# Patient Record
Sex: FEMALE | Race: BLACK OR AFRICAN AMERICAN | NOT HISPANIC OR LATINO | URBAN - METROPOLITAN AREA
[De-identification: names, ages, dates, MRNs, and addresses within clinical notes are randomized per-mention and may not be internally consistent; named-entity substitution may affect disease eponyms.]

---

## 2021-08-11 ENCOUNTER — INPATIENT (INPATIENT)
Facility: HOSPITAL | Age: 61
LOS: 2 days | Discharge: HOME | End: 2021-08-14
Attending: INTERNAL MEDICINE | Admitting: INTERNAL MEDICINE
Payer: MEDICAID

## 2021-08-11 VITALS — SYSTOLIC BLOOD PRESSURE: 144 MMHG | DIASTOLIC BLOOD PRESSURE: 72 MMHG | HEART RATE: 67 BPM | RESPIRATION RATE: 18 BRPM

## 2021-08-11 DIAGNOSIS — R07.9 CHEST PAIN, UNSPECIFIED: ICD-10-CM

## 2021-08-11 DIAGNOSIS — E78.5 HYPERLIPIDEMIA, UNSPECIFIED: ICD-10-CM

## 2021-08-11 DIAGNOSIS — Z29.9 ENCOUNTER FOR PROPHYLACTIC MEASURES, UNSPECIFIED: ICD-10-CM

## 2021-08-11 DIAGNOSIS — I25.10 ATHEROSCLEROTIC HEART DISEASE OF NATIVE CORONARY ARTERY WITHOUT ANGINA PECTORIS: ICD-10-CM

## 2021-08-11 DIAGNOSIS — R51.9 HEADACHE, UNSPECIFIED: ICD-10-CM

## 2021-08-11 DIAGNOSIS — I10 ESSENTIAL (PRIMARY) HYPERTENSION: ICD-10-CM

## 2021-08-11 LAB
ALBUMIN SERPL ELPH-MCNC: 4.4 G/DL — SIGNIFICANT CHANGE UP (ref 3.3–5)
ALP SERPL-CCNC: 99 U/L — SIGNIFICANT CHANGE UP (ref 40–120)
ALT FLD-CCNC: 27 U/L — SIGNIFICANT CHANGE UP (ref 4–33)
ANION GAP SERPL CALC-SCNC: 13 MMOL/L — SIGNIFICANT CHANGE UP (ref 7–14)
APTT BLD: 30.8 SEC — SIGNIFICANT CHANGE UP (ref 27–36.3)
AST SERPL-CCNC: 27 U/L — SIGNIFICANT CHANGE UP (ref 4–32)
BASOPHILS # BLD AUTO: 0.04 K/UL — SIGNIFICANT CHANGE UP (ref 0–0.2)
BASOPHILS NFR BLD AUTO: 0.8 % — SIGNIFICANT CHANGE UP (ref 0–2)
BILIRUB SERPL-MCNC: 1.2 MG/DL — SIGNIFICANT CHANGE UP (ref 0.2–1.2)
BUN SERPL-MCNC: 8 MG/DL — SIGNIFICANT CHANGE UP (ref 7–23)
CALCIUM SERPL-MCNC: 9.6 MG/DL — SIGNIFICANT CHANGE UP (ref 8.4–10.5)
CHLORIDE SERPL-SCNC: 102 MMOL/L — SIGNIFICANT CHANGE UP (ref 98–107)
CO2 SERPL-SCNC: 23 MMOL/L — SIGNIFICANT CHANGE UP (ref 22–31)
CREAT SERPL-MCNC: 0.67 MG/DL — SIGNIFICANT CHANGE UP (ref 0.5–1.3)
EOSINOPHIL # BLD AUTO: 0.16 K/UL — SIGNIFICANT CHANGE UP (ref 0–0.5)
EOSINOPHIL NFR BLD AUTO: 3.2 % — SIGNIFICANT CHANGE UP (ref 0–6)
GLUCOSE SERPL-MCNC: 95 MG/DL — SIGNIFICANT CHANGE UP (ref 70–99)
HCT VFR BLD CALC: 35.6 % — SIGNIFICANT CHANGE UP (ref 34.5–45)
HGB BLD-MCNC: 11.6 G/DL — SIGNIFICANT CHANGE UP (ref 11.5–15.5)
IANC: 2.74 K/UL — SIGNIFICANT CHANGE UP (ref 1.5–8.5)
IMM GRANULOCYTES NFR BLD AUTO: 0.4 % — SIGNIFICANT CHANGE UP (ref 0–1.5)
INR BLD: 1.14 RATIO — SIGNIFICANT CHANGE UP (ref 0.88–1.16)
LYMPHOCYTES # BLD AUTO: 1.76 K/UL — SIGNIFICANT CHANGE UP (ref 1–3.3)
LYMPHOCYTES # BLD AUTO: 34.7 % — SIGNIFICANT CHANGE UP (ref 13–44)
MCHC RBC-ENTMCNC: 30.3 PG — SIGNIFICANT CHANGE UP (ref 27–34)
MCHC RBC-ENTMCNC: 32.6 GM/DL — SIGNIFICANT CHANGE UP (ref 32–36)
MCV RBC AUTO: 93 FL — SIGNIFICANT CHANGE UP (ref 80–100)
MONOCYTES # BLD AUTO: 0.35 K/UL — SIGNIFICANT CHANGE UP (ref 0–0.9)
MONOCYTES NFR BLD AUTO: 6.9 % — SIGNIFICANT CHANGE UP (ref 2–14)
NEUTROPHILS # BLD AUTO: 2.74 K/UL — SIGNIFICANT CHANGE UP (ref 1.8–7.4)
NEUTROPHILS NFR BLD AUTO: 54 % — SIGNIFICANT CHANGE UP (ref 43–77)
NRBC # BLD: 0 /100 WBCS — SIGNIFICANT CHANGE UP
NRBC # FLD: 0 K/UL — SIGNIFICANT CHANGE UP
PLATELET # BLD AUTO: 267 K/UL — SIGNIFICANT CHANGE UP (ref 150–400)
POTASSIUM SERPL-MCNC: 4.2 MMOL/L — SIGNIFICANT CHANGE UP (ref 3.5–5.3)
POTASSIUM SERPL-SCNC: 4.2 MMOL/L — SIGNIFICANT CHANGE UP (ref 3.5–5.3)
PROT SERPL-MCNC: 7.7 G/DL — SIGNIFICANT CHANGE UP (ref 6–8.3)
PROTHROM AB SERPL-ACNC: 12.9 SEC — SIGNIFICANT CHANGE UP (ref 10.6–13.6)
RBC # BLD: 3.83 M/UL — SIGNIFICANT CHANGE UP (ref 3.8–5.2)
RBC # FLD: 13.3 % — SIGNIFICANT CHANGE UP (ref 10.3–14.5)
SARS-COV-2 RNA SPEC QL NAA+PROBE: SIGNIFICANT CHANGE UP
SODIUM SERPL-SCNC: 138 MMOL/L — SIGNIFICANT CHANGE UP (ref 135–145)
TROPONIN T, HIGH SENSITIVITY RESULT: <6 NG/L — SIGNIFICANT CHANGE UP
TROPONIN T, HIGH SENSITIVITY RESULT: <6 NG/L — SIGNIFICANT CHANGE UP
WBC # BLD: 5.07 K/UL — SIGNIFICANT CHANGE UP (ref 3.8–10.5)
WBC # FLD AUTO: 5.07 K/UL — SIGNIFICANT CHANGE UP (ref 3.8–10.5)

## 2021-08-11 PROCEDURE — 99223 1ST HOSP IP/OBS HIGH 75: CPT

## 2021-08-11 PROCEDURE — 99285 EMERGENCY DEPT VISIT HI MDM: CPT

## 2021-08-11 PROCEDURE — 71045 X-RAY EXAM CHEST 1 VIEW: CPT | Mod: 26

## 2021-08-11 RX ORDER — CLOPIDOGREL BISULFATE 75 MG/1
75 TABLET, FILM COATED ORAL DAILY
Refills: 0 | Status: DISCONTINUED | OUTPATIENT
Start: 2021-08-11 | End: 2021-08-14

## 2021-08-11 RX ORDER — ATORVASTATIN CALCIUM 80 MG/1
20 TABLET, FILM COATED ORAL AT BEDTIME
Refills: 0 | Status: DISCONTINUED | OUTPATIENT
Start: 2021-08-11 | End: 2021-08-14

## 2021-08-11 RX ORDER — ATORVASTATIN CALCIUM 80 MG/1
1 TABLET, FILM COATED ORAL
Qty: 0 | Refills: 0 | DISCHARGE

## 2021-08-11 RX ORDER — ISOSORBIDE DINITRATE 5 MG/1
5 TABLET ORAL THREE TIMES A DAY
Refills: 0 | Status: DISCONTINUED | OUTPATIENT
Start: 2021-08-11 | End: 2021-08-14

## 2021-08-11 RX ORDER — METOPROLOL TARTRATE 50 MG
50 TABLET ORAL
Refills: 0 | Status: DISCONTINUED | OUTPATIENT
Start: 2021-08-11 | End: 2021-08-14

## 2021-08-11 RX ORDER — ASPIRIN/CALCIUM CARB/MAGNESIUM 324 MG
81 TABLET ORAL DAILY
Refills: 0 | Status: DISCONTINUED | OUTPATIENT
Start: 2021-08-11 | End: 2021-08-14

## 2021-08-11 RX ORDER — ASPIRIN/CALCIUM CARB/MAGNESIUM 324 MG
1 TABLET ORAL
Qty: 0 | Refills: 0 | DISCHARGE

## 2021-08-11 RX ORDER — ACETAMINOPHEN 500 MG
650 TABLET ORAL EVERY 6 HOURS
Refills: 0 | Status: DISCONTINUED | OUTPATIENT
Start: 2021-08-11 | End: 2021-08-14

## 2021-08-11 RX ORDER — ISOSORBIDE DINITRATE 5 MG/1
0.5 TABLET ORAL
Qty: 0 | Refills: 0 | DISCHARGE

## 2021-08-11 RX ORDER — METOPROLOL TARTRATE 50 MG
1 TABLET ORAL
Qty: 0 | Refills: 0 | DISCHARGE

## 2021-08-11 RX ORDER — RAMIPRIL 5 MG
0.5 CAPSULE ORAL
Qty: 0 | Refills: 0 | DISCHARGE

## 2021-08-11 RX ORDER — LISINOPRIL 2.5 MG/1
20 TABLET ORAL
Refills: 0 | Status: DISCONTINUED | OUTPATIENT
Start: 2021-08-11 | End: 2021-08-14

## 2021-08-11 NOTE — ED ADULT NURSE NOTE - OBJECTIVE STATEMENT
Break coverage RN- Received Pt in room 15. pt A&OX3. pt with hx of HTN. Pt brought in by EMS from home. Pt c/o squeezing left sided chest pain radiating to the middle of the chest with sob beginning this morning. Pt came from [facility] July 12th and states she had elevated troponin when she was there that was treated with medication. Pt in no distress at this time. vitals stable as noted. respirations are equal and nonlabored, no respiratory distress noted, sating 100% room air. NSR on monitor. pt denies any sob, cough, fever/chills, headache, dizziness, n/v at this time. Pt arrives with 18 gauge iv to the left hand flushing well, labs sent. pt received 4 baby aspirin and 1 nitro sublingual by EMS. pt stable at this time, awaiting further plan. will reassess and monitor.

## 2021-08-11 NOTE — ED PROVIDER NOTE - CLINICAL SUMMARY MEDICAL DECISION MAKING FREE TEXT BOX
61F PMH HTN, HLD, on plavix for elevated troponin and chest pain in [facility] (May 2021) no stent placed, BIBEMS for 5.5 hours of intermittent squeezing left sided chest pain radiating to the sternum. Pt traveled from [facility] 1 month ago, denies SOB, cough, leg swelling.   No tachycardia, normal O2 sat, no calf tenderness, leg edema, distal pulses equal in all extremities.  ACS vs CHF vs angina vs myocarditis  ecg, labs, cxr  reassess after labs  Likely admit to medicine for observation and cardiac workup

## 2021-08-11 NOTE — ED ADULT NURSE REASSESSMENT NOTE - NS ED NURSE REASSESS COMMENT FT1
Patient A&Ox4, respirations even and unlabored. No complaints at this time. Report given to ESSU 2 RN.

## 2021-08-11 NOTE — ED ADULT TRIAGE NOTE - CHIEF COMPLAINT QUOTE
Patient bought to ER by EMS from home for c/o squeezing left sided chest pain radiating to middle. Pt came from [facility] July 12th and states she had elevated troponin when she was there that was treated with medication.  Left hand 18 gauge, 4 baby aspirin and 1 Nitro sublingual  Key Lopez [phone] Sister  [phone] Cousin

## 2021-08-11 NOTE — H&P ADULT - NSICDXFAMILYHX_GEN_ALL_CORE_FT
FAMILY HISTORY:  Family history of cerebrovascular accident (CVA) in father    Mother  Still living? Unknown  Family history of heart disease, Age at diagnosis: Age Unknown

## 2021-08-11 NOTE — H&P ADULT - NSHPSOCIALHISTORY_GEN_ALL_CORE
Lives in [facility], visiting family on vacation  Retired - worked in textile industry  Never smoker  Denies alcohol or illicit drug use

## 2021-08-11 NOTE — ED ADULT NURSE NOTE - CHIEF COMPLAINT QUOTE
Patient bought to ER by EMS from home for c/o squeezing left sided chest pain radiating to middle. Pt came from [facility] July 12th and states she had elevated troponin when she was there that was treated with medication.  Left hand 18 gauge, 4 baby aspirin and 1 Nitro sublingual  Key Lopez [phone] Sister  [phone] Cousin

## 2021-08-11 NOTE — ED PROVIDER NOTE - NSICDXFAMILYHX_GEN_ALL_CORE_FT
detailed exam FAMILY HISTORY:  Family history of cerebrovascular accident (CVA) in father    Mother  Still living? Unknown  Family history of heart disease, Age at diagnosis: Age Unknown

## 2021-08-11 NOTE — H&P ADULT - NSHPLABSRESULTS_GEN_ALL_CORE
11.6   5.07  )-----------( 267      ( 11 Aug 2021 16:57 )             35.6     08-11    138  |  102  |  8   ----------------------------<  95  4.2   |  23  |  0.67    Ca    9.6      11 Aug 2021 16:57    TPro  7.7  /  Alb  4.4  /  TBili  1.2  /  DBili  x   /  AST  27  /  ALT  27  /  AlkPhos  99  08-11    PT/INR - ( 11 Aug 2021 16:57 )   PT: 12.9 sec;   INR: 1.14 ratio    PTT - ( 11 Aug 2021 16:57 )  PTT:30.8 sec    Troponin T, High Sensitivity Result: <6 ng/L (08.11.21 @ 18:52)  Troponin T, High Sensitivity Result: <6 ng/L (08.11.21 @ 16:57)    < from: Xray Chest 1 View AP/PA (08.11.21 @ 17:11) >  LINES/TUBES: None  CARDIAC/MEDIASTINUM/HILUM: Unremarkable  LUNG PARENCHYMA/PLEURA: No focal parenchymal opacities, pleural effusion, or pneumothorax.  SKELETON/SOFT TISSUES: No osseous abnormality.  VASCULAR: Unremarkable.  IMPRESSION: Clear lungs.  < end of copied text > 11.6   5.07  )-----------( 267      ( 11 Aug 2021 16:57 )             35.6     08-11    138  |  102  |  8   ----------------------------<  95  4.2   |  23  |  0.67    Ca    9.6      11 Aug 2021 16:57    TPro  7.7  /  Alb  4.4  /  TBili  1.2  /  DBili  x   /  AST  27  /  ALT  27  /  AlkPhos  99  08-11    PT/INR - ( 11 Aug 2021 16:57 )   PT: 12.9 sec;   INR: 1.14 ratio    PTT - ( 11 Aug 2021 16:57 )  PTT:30.8 sec    Troponin T, High Sensitivity Result: <6 ng/L (08.11.21 @ 18:52)  Troponin T, High Sensitivity Result: <6 ng/L (08.11.21 @ 16:57)    < from: Xray Chest 1 View AP/PA (08.11.21 @ 17:11) >  LINES/TUBES: None  CARDIAC/MEDIASTINUM/HILUM: Unremarkable  LUNG PARENCHYMA/PLEURA: No focal parenchymal opacities, pleural effusion, or pneumothorax.  SKELETON/SOFT TISSUES: No osseous abnormality.  VASCULAR: Unremarkable.  IMPRESSION: Clear lungs.  < end of copied text >    EKG personally reviewed - 60bpm NSR, TWI V1-V2, flat T in III, QTc 426ms

## 2021-08-11 NOTE — H&P ADULT - PROBLEM SELECTOR PLAN 1
negative trops x2  monitor on tele  low suspicion for PE however given recent travel will check d-dimer with AM labs  check TSH, FLP, A1c with AM labs  check TTE  cardiology to assume care in AM

## 2021-08-11 NOTE — H&P ADULT - HISTORY OF PRESENT ILLNESS
61-yaer-old female with HTN, HLD, CAD on clopidogrel (for elevated troponin and chest pain in [facility] 5/2021, no stents), presenting with 5.5 hours of intermittent squeezing left sided chest pain radiating to the sternum. Patient received 4 baby aspirin and 1 nitroglycerin from EMS. Pt also reports pins and needles in the left hand, and lightheadedness, denies n/v/palpitations/diaphoresis. Pt traveled from [facility] 1 month ago, denies SOB, cough, leg swelling.    In the ED VS:  97.9  64-68  143-151/68-78  15-18  100%RA 61-yearr-old female with HTN, HLD, ?CAD on DAPT for ?elevated troponin and chest pain in [facility] (no history of echo/stress/stents), presenting with intermittent left sided chest pain radiating to the sternum since 10AM. Pain started while patient was getting ready this morning. Pain improved slightly with nitroglycerin at home. She reports pain was 9/10, lasting a few minutes at a time, at one point radiates to left neck, and at times radiates to left upper back. She noted associated diaphoresis and had numbness in her fingers of her left hand transiently that self resolved. She had an episode of vertigo at home that also self resolved. She notes a frontal headache, no vision changes, no weakness. Patient received 4 baby aspirin and 1 nitroglycerin from EMS. At present pain is 6/10, radiating only to sternum. Pt traveled from [facility] 1 month ago, is here on vacation. She denies shortness of breath, cough, LE edema.    In the ED VS:  97.9  64-68  143-151/68-78  15-18  100%RA

## 2021-08-11 NOTE — ED PROVIDER NOTE - OBJECTIVE STATEMENT
61F PMH HTN, HLD, on plavix for elevated troponin and chest pain in [facility] (May 2021) no stent placed, complaining of 5.5 hours of intermittent squeezing left sided and sternal chest pain 61F PMH HTN, HLD, on plavix for elevated troponin and chest pain in [facility] (May 2021) no stent placed, BIBEMS for 5.5 hours of intermittent squeezing left sided chest pain radiating to the sternum. 4 baby aspirin and 1 nitroglycerin given by EMS. Pt also reports pins and needles in the left hand, and lightheadedness, denies n/v/palpitations/diaphoresis. Pt traveled from [facility] 1 month ago, denies SOB, cough, leg swelling.

## 2021-08-11 NOTE — H&P ADULT - ASSESSMENT
61-yearr-old female with HTN, HLD, ?CAD on DAPT for ?elevated troponin and chest pain in [facility] (no history of echo/stress/stents), presenting with intermittent left sided chest pain

## 2021-08-11 NOTE — ED PROVIDER NOTE - ATTENDING CONTRIBUTION TO CARE
61F p/w squeezing L sided CP rad to middle.  Recent travel from [facility] Jul 12.  EKG SR at 61 no tashia no std no twi.  pmhx HTN HLD recent CP elevated trop, on plavix starting in May.  Pt unsure if stent.  Denies surgeries.  5.5 hrs of L side squeezing CP rad to midsternal area.  no N/V or diaphoresis.  Some lightheadness.  EKG SR at 61 no tashia no std.  twi v2.  incomplete RBBB.  Given baby ASA and SL NTG.  Pain is intermittent.  No SOB.  No cough.  Pt came from [facility] 1 month ago, no LE swelling.  Plan admit concern for ACS 61F p/w squeezing L sided CP rad to middle.  Recent travel from [facility] Jul 12. Pmhx HTN HLD recent CP elevated trop, on plavix starting in May.  Pt unsure if stent.  Denies surgeries.  5.5 hrs of L side squeezing CP rad to midsternal area.  no N/V or diaphoresis.  Some lightheadness.  Given baby ASA and SL NTG.  Pain is intermittent.  No SOB.  No cough.  Pt came from [facility] 1 month ago, no LE swelling.  Plan admit concern for ACS.  VS:  unremarkable    GEN - NAD;   well appearing;   A+O x3   HEAD - NC/AT     ENT - PEERL, EOMI, mucous membranes    moist , no discharge      NECK: Neck supple, non-tender without lymphadenopathy, no masses, no JVD  PULM - CTA b/l,  symmetric breath sounds  COR -  normal heart sounds    ABD - , ND, NT, soft,  BACK - no CVA tenderness, nontender spine     EXTREMS - no edema, no deformity, warm and well perfused    SKIN - no rash    or bruising      NEUROLOGIC - alert, face symmetric, speech fluent, sensation nl, motor no focal deficit.

## 2021-08-11 NOTE — H&P ADULT - NSHPPHYSICALEXAM_GEN_ALL_CORE
PHYSICAL EXAM:  GENERAL: NAD, well-developed, well-nourished  HEAD:  Atraumatic, Normocephalic  EYES: PERRL, conjunctiva and sclera clear  NECK: Supple, No JVD, no LAD  CHEST/LUNG: Clear to auscultation bilaterally; No wheezes, rales or rhonchi; good effort; good air movement  HEART: Regular rate and rhythm; No murmurs, rubs, or gallops, (+)S1, S2  ABDOMEN: Soft, Nontender, Nondistended; Normal Bowel sounds   EXTREMITIES:  2+ Peripheral Pulses, No clubbing, cyanosis, or edema  PSYCH: normal mood and affect, A&Ox3  NEUROLOGY: non-focal, sensation grossly intact; 5/5 strength b/l UE  SKIN: No rashes or lesions

## 2021-08-11 NOTE — H&P ADULT - NSHPREVIEWOFSYSTEMS_GEN_ALL_CORE
REVIEW OF SYSTEMS:    CONSTITUTIONAL: No weakness, fevers or chills  EYES/ENT: No visual changes;  No dysphagia; No sore throat; No rhinorrhea; No sinus pain/pressure  NECK: No pain or stiffness  RESPIRATORY: No cough, wheezing, hemoptysis; No shortness of breath  CARDIOVASCULAR: (+) chest pain; No palpitations; No lower extremity edema  GASTROINTESTINAL: No abdominal or epigastric pain. No nausea, vomiting, or hematemesis; No diarrhea or constipation. No melena or hematochezia.  GENITOURINARY: No dysuria, frequency or hematuria  NEUROLOGICAL: No numbness or weakness; transient paresthesias L hand  MSK: ambulates without aid; no falls/trauma  SKIN: No itching, burning, rashes, or lesions   All other review of systems is negative unless indicated above.

## 2021-08-12 LAB
ANION GAP SERPL CALC-SCNC: 14 MMOL/L — SIGNIFICANT CHANGE UP (ref 7–14)
BUN SERPL-MCNC: 8 MG/DL — SIGNIFICANT CHANGE UP (ref 7–23)
CALCIUM SERPL-MCNC: 9.9 MG/DL — SIGNIFICANT CHANGE UP (ref 8.4–10.5)
CHLORIDE SERPL-SCNC: 104 MMOL/L — SIGNIFICANT CHANGE UP (ref 98–107)
CHOLEST SERPL-MCNC: 199 MG/DL — SIGNIFICANT CHANGE UP
CK MB CFR SERPL CALC: 1.3 NG/ML — SIGNIFICANT CHANGE UP
CO2 SERPL-SCNC: 23 MMOL/L — SIGNIFICANT CHANGE UP (ref 22–31)
COVID-19 SPIKE DOMAIN AB INTERP: POSITIVE
COVID-19 SPIKE DOMAIN ANTIBODY RESULT: >250 U/ML — HIGH
CREAT SERPL-MCNC: 0.69 MG/DL — SIGNIFICANT CHANGE UP (ref 0.5–1.3)
D DIMER BLD IA.RAPID-MCNC: <150 NG/ML DDU — SIGNIFICANT CHANGE UP
GLUCOSE SERPL-MCNC: 106 MG/DL — HIGH (ref 70–99)
HCT VFR BLD CALC: 38.1 % — SIGNIFICANT CHANGE UP (ref 34.5–45)
HCV AB S/CO SERPL IA: 0.15 S/CO — SIGNIFICANT CHANGE UP (ref 0–0.99)
HCV AB SERPL-IMP: SIGNIFICANT CHANGE UP
HDLC SERPL-MCNC: 60 MG/DL — SIGNIFICANT CHANGE UP
HGB BLD-MCNC: 12.4 G/DL — SIGNIFICANT CHANGE UP (ref 11.5–15.5)
LIPID PNL WITH DIRECT LDL SERPL: 129 MG/DL — HIGH
MAGNESIUM SERPL-MCNC: 2.1 MG/DL — SIGNIFICANT CHANGE UP (ref 1.6–2.6)
MCHC RBC-ENTMCNC: 30.4 PG — SIGNIFICANT CHANGE UP (ref 27–34)
MCHC RBC-ENTMCNC: 32.5 GM/DL — SIGNIFICANT CHANGE UP (ref 32–36)
MCV RBC AUTO: 93.4 FL — SIGNIFICANT CHANGE UP (ref 80–100)
NON HDL CHOLESTEROL: 139 MG/DL — HIGH
NRBC # BLD: 0 /100 WBCS — SIGNIFICANT CHANGE UP
NRBC # FLD: 0 K/UL — SIGNIFICANT CHANGE UP
NT-PROBNP SERPL-SCNC: 115 PG/ML — SIGNIFICANT CHANGE UP
PHOSPHATE SERPL-MCNC: 3.6 MG/DL — SIGNIFICANT CHANGE UP (ref 2.5–4.5)
PLATELET # BLD AUTO: 266 K/UL — SIGNIFICANT CHANGE UP (ref 150–400)
POTASSIUM SERPL-MCNC: 4.2 MMOL/L — SIGNIFICANT CHANGE UP (ref 3.5–5.3)
POTASSIUM SERPL-SCNC: 4.2 MMOL/L — SIGNIFICANT CHANGE UP (ref 3.5–5.3)
RBC # BLD: 4.08 M/UL — SIGNIFICANT CHANGE UP (ref 3.8–5.2)
RBC # FLD: 13.2 % — SIGNIFICANT CHANGE UP (ref 10.3–14.5)
SARS-COV-2 IGG+IGM SERPL QL IA: >250 U/ML — HIGH
SARS-COV-2 IGG+IGM SERPL QL IA: POSITIVE
SODIUM SERPL-SCNC: 141 MMOL/L — SIGNIFICANT CHANGE UP (ref 135–145)
TRIGL SERPL-MCNC: 52 MG/DL — SIGNIFICANT CHANGE UP
TROPONIN T, HIGH SENSITIVITY RESULT: <6 NG/L — SIGNIFICANT CHANGE UP
TROPONIN T, HIGH SENSITIVITY RESULT: <6 NG/L — SIGNIFICANT CHANGE UP
TSH SERPL-MCNC: 1.42 UIU/ML — SIGNIFICANT CHANGE UP (ref 0.27–4.2)
WBC # BLD: 3.8 K/UL — SIGNIFICANT CHANGE UP (ref 3.8–10.5)
WBC # FLD AUTO: 3.8 K/UL — SIGNIFICANT CHANGE UP (ref 3.8–10.5)

## 2021-08-12 RX ADMIN — LISINOPRIL 20 MILLIGRAM(S): 2.5 TABLET ORAL at 18:04

## 2021-08-12 RX ADMIN — LISINOPRIL 20 MILLIGRAM(S): 2.5 TABLET ORAL at 09:04

## 2021-08-12 RX ADMIN — Medication 650 MILLIGRAM(S): at 11:48

## 2021-08-12 RX ADMIN — ISOSORBIDE DINITRATE 5 MILLIGRAM(S): 5 TABLET ORAL at 18:05

## 2021-08-12 RX ADMIN — Medication 650 MILLIGRAM(S): at 19:10

## 2021-08-12 RX ADMIN — ISOSORBIDE DINITRATE 5 MILLIGRAM(S): 5 TABLET ORAL at 05:18

## 2021-08-12 RX ADMIN — ISOSORBIDE DINITRATE 5 MILLIGRAM(S): 5 TABLET ORAL at 09:05

## 2021-08-12 RX ADMIN — Medication 50 MILLIGRAM(S): at 18:04

## 2021-08-12 RX ADMIN — Medication 650 MILLIGRAM(S): at 11:36

## 2021-08-12 RX ADMIN — Medication 650 MILLIGRAM(S): at 18:19

## 2021-08-12 RX ADMIN — CLOPIDOGREL BISULFATE 75 MILLIGRAM(S): 75 TABLET, FILM COATED ORAL at 09:04

## 2021-08-12 RX ADMIN — Medication 81 MILLIGRAM(S): at 11:36

## 2021-08-12 NOTE — CHART NOTE - NSCHARTNOTEFT_GEN_A_CORE
Notified by RN, patient was complaining of chest pain. Patient seen and examined at bedside. States the chest pain is 6/10, starts from midsternal area, radiating to the back. States she feels more of the pain in the back compared to the front. States chest pain comes and goes. Denies any dizziness, shortness of breath, palpitations. EKG unchanged. Cardiac enzymes ordered. Tylenol given. Discussed with cardiologist, recommends CT to rule out dissection.

## 2021-08-12 NOTE — PATIENT PROFILE ADULT - NSPROGENPREVTRANSF_GEN_A_NUR
no
If you are a smoker, it is important for your health to stop smoking. Please be aware that second hand smoke is also harmful.

## 2021-08-13 LAB
ANION GAP SERPL CALC-SCNC: 13 MMOL/L — SIGNIFICANT CHANGE UP (ref 7–14)
BUN SERPL-MCNC: 18 MG/DL — SIGNIFICANT CHANGE UP (ref 7–23)
CALCIUM SERPL-MCNC: 10.1 MG/DL — SIGNIFICANT CHANGE UP (ref 8.4–10.5)
CHLORIDE SERPL-SCNC: 103 MMOL/L — SIGNIFICANT CHANGE UP (ref 98–107)
CO2 SERPL-SCNC: 24 MMOL/L — SIGNIFICANT CHANGE UP (ref 22–31)
CREAT SERPL-MCNC: 0.83 MG/DL — SIGNIFICANT CHANGE UP (ref 0.5–1.3)
GLUCOSE SERPL-MCNC: 94 MG/DL — SIGNIFICANT CHANGE UP (ref 70–99)
HCT VFR BLD CALC: 35.4 % — SIGNIFICANT CHANGE UP (ref 34.5–45)
HGB BLD-MCNC: 11.7 G/DL — SIGNIFICANT CHANGE UP (ref 11.5–15.5)
MAGNESIUM SERPL-MCNC: 2.1 MG/DL — SIGNIFICANT CHANGE UP (ref 1.6–2.6)
MCHC RBC-ENTMCNC: 30.7 PG — SIGNIFICANT CHANGE UP (ref 27–34)
MCHC RBC-ENTMCNC: 33.1 GM/DL — SIGNIFICANT CHANGE UP (ref 32–36)
MCV RBC AUTO: 92.9 FL — SIGNIFICANT CHANGE UP (ref 80–100)
NRBC # BLD: 0 /100 WBCS — SIGNIFICANT CHANGE UP
NRBC # FLD: 0 K/UL — SIGNIFICANT CHANGE UP
PHOSPHATE SERPL-MCNC: 4.2 MG/DL — SIGNIFICANT CHANGE UP (ref 2.5–4.5)
PLATELET # BLD AUTO: 272 K/UL — SIGNIFICANT CHANGE UP (ref 150–400)
POTASSIUM SERPL-MCNC: 4 MMOL/L — SIGNIFICANT CHANGE UP (ref 3.5–5.3)
POTASSIUM SERPL-SCNC: 4 MMOL/L — SIGNIFICANT CHANGE UP (ref 3.5–5.3)
RBC # BLD: 3.81 M/UL — SIGNIFICANT CHANGE UP (ref 3.8–5.2)
RBC # FLD: 13.5 % — SIGNIFICANT CHANGE UP (ref 10.3–14.5)
SODIUM SERPL-SCNC: 140 MMOL/L — SIGNIFICANT CHANGE UP (ref 135–145)
WBC # BLD: 4.78 K/UL — SIGNIFICANT CHANGE UP (ref 3.8–10.5)
WBC # FLD AUTO: 4.78 K/UL — SIGNIFICANT CHANGE UP (ref 3.8–10.5)

## 2021-08-13 PROCEDURE — 93016 CV STRESS TEST SUPVJ ONLY: CPT | Mod: GC

## 2021-08-13 PROCEDURE — 71275 CT ANGIOGRAPHY CHEST: CPT | Mod: 26

## 2021-08-13 PROCEDURE — 74174 CTA ABD&PLVS W/CONTRAST: CPT | Mod: 26

## 2021-08-13 PROCEDURE — 78452 HT MUSCLE IMAGE SPECT MULT: CPT | Mod: 26

## 2021-08-13 PROCEDURE — 76770 US EXAM ABDO BACK WALL COMP: CPT | Mod: 26

## 2021-08-13 PROCEDURE — 93018 CV STRESS TEST I&R ONLY: CPT | Mod: GC

## 2021-08-13 PROCEDURE — 93306 TTE W/DOPPLER COMPLETE: CPT | Mod: 26

## 2021-08-13 RX ADMIN — ATORVASTATIN CALCIUM 20 MILLIGRAM(S): 80 TABLET, FILM COATED ORAL at 21:13

## 2021-08-13 RX ADMIN — Medication 650 MILLIGRAM(S): at 13:46

## 2021-08-13 RX ADMIN — LISINOPRIL 20 MILLIGRAM(S): 2.5 TABLET ORAL at 17:16

## 2021-08-13 RX ADMIN — ISOSORBIDE DINITRATE 5 MILLIGRAM(S): 5 TABLET ORAL at 12:57

## 2021-08-13 RX ADMIN — ISOSORBIDE DINITRATE 5 MILLIGRAM(S): 5 TABLET ORAL at 05:34

## 2021-08-13 RX ADMIN — Medication 50 MILLIGRAM(S): at 08:02

## 2021-08-13 RX ADMIN — Medication 650 MILLIGRAM(S): at 12:58

## 2021-08-13 RX ADMIN — ISOSORBIDE DINITRATE 5 MILLIGRAM(S): 5 TABLET ORAL at 17:16

## 2021-08-13 RX ADMIN — LISINOPRIL 20 MILLIGRAM(S): 2.5 TABLET ORAL at 05:33

## 2021-08-13 RX ADMIN — Medication 81 MILLIGRAM(S): at 12:58

## 2021-08-13 RX ADMIN — CLOPIDOGREL BISULFATE 75 MILLIGRAM(S): 75 TABLET, FILM COATED ORAL at 12:57

## 2021-08-13 NOTE — PROGRESS NOTE ADULT - SUBJECTIVE AND OBJECTIVE BOX
Date of service: 08/13/21    chief complaint: chest pain     extended hpi: 61 year old female with history of HTN, HLD ? cad who is being seen for chest pain.  The patient has been having intermittent chest pain for several months that worsened over the last day prompting her to come to the ED.  S: no chest pain or sob; ros otherwise negative.     Review of Systems:   Constitutional: [ ] fevers, [ ] chills.   Skin: [ ] dry skin. [ ] rashes.  Psychiatric: [ ] depression, [ ] anxiety.   Gastrointestinal: [ ] BRBPR, [ ] melena.   Neurological: [ ] confusion. [ ] seizures. [ ] shuffling gait.   Ears,Nose,Mouth and Throat: [ ] ear pain [ ] sore throat.   Eyes: [ ] diplopia.   Respiratory: [ ] hemoptysis. [ ] shortness of breath  Cardiovascular: See HPI above  Hematologic/Lymphatic: [ ] anemia. [ ] painful nodes. [ ] prolonged bleeding.   Genitourinary: [ ] hematuria. [ ] flank pain.   Endocrine: [ ] significant change in weight. [ ] intolerance to heat and cold.     Review of systems [x ] otherwise negative, [ ] otherwise unable to obtain    FH: no family history of sudden cardiac death in first degree relatives    SH: [ ] tobacco, [ ] alcohol, [ ] drugs    acetaminophen   Tablet .. 650 milliGRAM(s) Oral every 6 hours PRN  aspirin enteric coated 81 milliGRAM(s) Oral daily  atorvastatin 20 milliGRAM(s) Oral at bedtime  clopidogrel Tablet 75 milliGRAM(s) Oral daily  isosorbide   dinitrate Tablet (ISORDIL) 5 milliGRAM(s) Oral three times a day  lisinopril 20 milliGRAM(s) Oral two times a day  metoprolol tartrate 50 milliGRAM(s) Oral two times a day                            11.7   4.78  )-----------( 272      ( 13 Aug 2021 07:16 )             35.4       08-13    140  |  103  |  18  ----------------------------<  94  4.0   |  24  |  0.83    Ca    10.1      13 Aug 2021 07:16  Phos  4.2     08-13  Mg     2.10     08-13    TPro  7.7  /  Alb  4.4  /  TBili  1.2  /  DBili  x   /  AST  27  /  ALT  27  /  AlkPhos  99  08-11      CARDIAC MARKERS ( 12 Aug 2021 19:08 )  x     / x     / x     / x     / 1.3 ng/mL        T(C): 36.8 (08-13-21 @ 05:25), Max: 36.8 (08-12-21 @ 16:39)  HR: 72 (08-13-21 @ 08:00) (53 - 79)  BP: 121/67 (08-13-21 @ 08:00) (113/56 - 148/77)  RR: 18 (08-13-21 @ 05:25) (18 - 18)  SpO2: 100% (08-13-21 @ 05:25) (98% - 100%)  Wt(kg): --    I&O's Summary      General: Well nourished in no acute distress. Alert and Oriented * 3.   Head: Normocephalic and atraumatic.   Neck: No JVD. No bruits. Supple. Does not appear to be enlarged.   Cardiovascular: + S1,S2 ; RRR Soft systolic murmur at the left lower sternal border. No rubs noted.    Lungs: CTA b/l. No rhonchi, rales or wheezes.   Abdomen: + BS, soft. Non tender. Non distended. No rebound. No guarding.   Extremities: No clubbing/cyanosis/edema.   Neurologic: Moves all four extremities. Full range of motion.   Skin: Warm and moist. The patient's skin has normal elasticity and good skin turgor.   Psychiatric: Appropriate mood and affect.  Musculoskeletal: Normal range of motion, normal strength  Chest: some point tenderness along right chest    Tele: SR    TTE:  < from: Transthoracic Echocardiogram (08.13.21 @ 08:11) >  1. Normal mitral valve. Minimal mitral regurgitation.  2. Normal left ventricular internal dimensions and wall  thicknesses.  3. Normal left ventricular systolic function. No segmental  wall motion abnormalities.  4. Normal left ventricular diastolic function.  5. Normal right ventricular size and function.    < end of copied text >      A/P: 61 year old female with history of HTN, HLD ? cad who is being seen for chest pain.  The patient has been having intermittent chest pain for several months that worsened over the last day prompting her to come to the ED.    -TTE with normal LV function   -MI ruled out  -follow up NST  -given chest pain radiating to back, ct ordered to rule out dissection - follow up results  -further workup pending above    Lisa Reynaga MD

## 2021-08-13 NOTE — DISCHARGE NOTE PROVIDER - NSDCCPCAREPLAN_GEN_ALL_CORE_FT
PRINCIPAL DISCHARGE DIAGNOSIS  Diagnosis: Cardiac chest pain  Assessment and Plan of Treatment: Symptoms resolved.  Continue all medications as prescribed.  Please follow up with your PCP in [facility] upon return home.       PRINCIPAL DISCHARGE DIAGNOSIS  Diagnosis: Cardiac chest pain  Assessment and Plan of Treatment: Symptoms resolved. Stress Test and Echocardiogram were normal.  Continue all medications as prescribed.  Please follow up with your Primyar Care Doctor in [facility] upon return home.      SECONDARY DISCHARGE DIAGNOSES  Diagnosis: Hyperlipidemia  Assessment and Plan of Treatment: To maintain normal cholesterol levels to prevent stroke, coronary artery disease, peripheral vascular disease and heart attacks.   Low fat diet, exercise daily and continue current medications. Follow up with primary care physician and cardiologist for management.    Diagnosis: CAD (coronary artery disease)  Assessment and Plan of Treatment: To be asymptomatic, to reduce risks factors such as hypertension, diabetes and hyperlipidemia to lower the risk of blood clots formation; and to prevent complications of coronary artery disease such as worsening chest pain, heart attack and death.   Continue aspirin and Plavix, do not stop unless instructed by your physician.  Continue low salt, fat, cholesterol and carbohydrate diet. Follow up with cardiologist and primary care physician's routine appointment.    Diagnosis: Essential hypertension  Assessment and Plan of Treatment: To maintain a normal blood pressure to prevent heart attack, stroke and renal failure.   Low sodium and fat diet, continue anti-hypertensive medications, and follow up with primary care physician.     PRINCIPAL DISCHARGE DIAGNOSIS  Diagnosis: Cardiac chest pain  Assessment and Plan of Treatment: Symptoms resolved. Stress Test and Echocardiogram were normal.  Continue all medications as prescribed.  Please follow up with your Primyar Care Doctor in [facility] upon return home.      SECONDARY DISCHARGE DIAGNOSES  Diagnosis: Hyperlipidemia  Assessment and Plan of Treatment: To maintain normal cholesterol levels to prevent stroke, coronary artery disease, peripheral vascular disease and heart attacks.   Low fat diet, exercise daily and continue current medications. Follow up with primary care physician and cardiologist for management.    Diagnosis: CAD (coronary artery disease)  Assessment and Plan of Treatment: To be asymptomatic, to reduce risks factors such as hypertension, diabetes and hyperlipidemia to lower the risk of blood clots formation; and to prevent complications of coronary artery disease such as worsening chest pain, heart attack and death.   Continue aspirin and Plavix, do not stop unless instructed by your physician.  Continue low salt, fat, cholesterol and carbohydrate diet. Follow up with cardiologist and primary care physician's routine appointment.    Diagnosis: Essential hypertension  Assessment and Plan of Treatment: To maintain a normal blood pressure to prevent heart attack, stroke and renal failure.   Low sodium and fat diet, continue anti-hypertensive medications, and follow up with primary care physician.    Diagnosis: Renal cyst  Assessment and Plan of Treatment: Will need follow up with repeat imaging.     PRINCIPAL DISCHARGE DIAGNOSIS  Diagnosis: Cardiac chest pain  Assessment and Plan of Treatment: Symptoms resolved. Stress Test and Echocardiogram were normal.  Continue all medications as prescribed.  Please follow up with your Primyar Care Doctor in [facility] upon return home.      SECONDARY DISCHARGE DIAGNOSES  Diagnosis: Essential hypertension  Assessment and Plan of Treatment: To maintain a normal blood pressure to prevent heart attack, stroke and renal failure.   Low sodium and fat diet, continue anti-hypertensive medications, and follow up with primary care physician.    Diagnosis: Hyperlipidemia  Assessment and Plan of Treatment: To maintain normal cholesterol levels to prevent stroke, coronary artery disease, peripheral vascular disease and heart attacks.   Low fat diet, exercise daily and continue current medications. Follow up with primary care physician and cardiologist for management.    Diagnosis: CAD (coronary artery disease)  Assessment and Plan of Treatment: To be asymptomatic, to reduce risks factors such as hypertension, diabetes and hyperlipidemia to lower the risk of blood clots formation; and to prevent complications of coronary artery disease such as worsening chest pain, heart attack and death.   Continue aspirin  do not stop unless instructed by your physician.  Continue low salt, fat, cholesterol and carbohydrate diet. Follow up with cardiologist and primary care physician's routine appointment.  STOP plavix    Diagnosis: Renal cyst  Assessment and Plan of Treatment: Will need follow up with repeat imaging.     PRINCIPAL DISCHARGE DIAGNOSIS  Diagnosis: Cardiac chest pain  Assessment and Plan of Treatment: Symptoms resolved. Stress Test and Echocardiogram were normal.  Continue all medications as prescribed.  Please follow up with your Primary Care Doctor in [facility] upon return home.   You have a small lung nodule- please follow up with a repeat CT scan in one year. Follow up with Dr Reynaga in 2 weeks  Follow up with Dr Mathews in one week.      SECONDARY DISCHARGE DIAGNOSES  Diagnosis: Essential hypertension  Assessment and Plan of Treatment: To maintain a normal blood pressure to prevent heart attack, stroke and renal failure.   Low sodium and fat diet, continue anti-hypertensive medications, and follow up with primary care physician.    Diagnosis: Hyperlipidemia  Assessment and Plan of Treatment: To maintain normal cholesterol levels to prevent stroke, coronary artery disease, peripheral vascular disease and heart attacks.   Low fat diet, exercise daily and continue current medications. Follow up with primary care physician and cardiologist for management.    Diagnosis: CAD (coronary artery disease)  Assessment and Plan of Treatment: To be asymptomatic, to reduce risks factors such as hypertension, diabetes and hyperlipidemia to lower the risk of blood clots formation; and to prevent complications of coronary artery disease such as worsening chest pain, heart attack and death.   Continue aspirin  do not stop unless instructed by your physician.  Continue low salt, fat, cholesterol and carbohydrate diet. Follow up with cardiologist and primary care physician's routine appointment.  STOP plavix    Diagnosis: Renal cyst  Assessment and Plan of Treatment: Will need follow up with repeat imaging. Follow up with Dr Mathews- nephrologist in on month

## 2021-08-13 NOTE — DISCHARGE NOTE PROVIDER - CARE PROVIDERS DIRECT ADDRESSES
,DirectAddress_Unknown ,DirectAddress_Unknown,DirectAddress_Unknown ,DirectAddress_Unknown,DirectAddress_Unknown,[email or web address]

## 2021-08-13 NOTE — DISCHARGE NOTE PROVIDER - CARE PROVIDER_API CALL
PCP, External  Phone: (   )    -  Fax: (   )    -  Follow Up Time: 2 weeks   PCP, External  Phone: (   )    -  Fax: (   )    -  Follow Up Time: 2 weeks    Lisa Reynaga)  Cardiovascular Disease; Internal Medicine; Interventional Cardiology  [address] [place] [address]  Phone: [phone]  Fax: [phone]  Follow Up Time:    Lisa Reynaga (MD)  Cardiovascular Disease; Internal Medicine; Interventional Cardiology  [address] [place] [address]  Phone: [phone]  Fax: [phone]  Follow Up Time:     PCP, External  Phone: (   )    -  Fax: (   )    -  Follow Up Time: 2 weeks    Smita Mathews)  Internal Medicine; Nephrology  [address] [facility] [facility] [address], [address]  [place] [address]  Phone: [phone]  Fax: [phone]  Follow Up Time:

## 2021-08-13 NOTE — CHART NOTE - NSCHARTNOTEFT_GEN_A_CORE
NEED FOR HOSPITAL BED    The patient has medical conditions (NSTEMI I21.4. CAD . HTN I10. CVA I63.9. Aortic stenosis I35.0) which require positioning of the body in ways not feasible with an ordinary bed, and require frequent changes in body position. Gel mattress needed to prevent skin breakdown. Head of bed must elevate 30 degrees due to dysphagia diagnosis.

## 2021-08-13 NOTE — DISCHARGE NOTE PROVIDER - NSDCFUADDINST_GEN_ALL_CORE_FT
Will need follow up for a left renal cyst. Will need follow up for a left renal cyst in 9-12 months in [facility].

## 2021-08-13 NOTE — DISCHARGE NOTE PROVIDER - NSDCMRMEDTOKEN_GEN_ALL_CORE_FT
Aspirin Enteric Coated 81 mg oral delayed release tablet: 1 tab(s) orally once a day  atorvastatin 20 mg oral tablet: 1 tab(s) orally once a day (at bedtime)  clopidogrel 75 mg oral tablet: 1 tab(s) orally once a day  Isordil 10 mg sublingual tablet: 0.5 tab(s) sublingual 3 times a day  metoprolol tartrate 50 mg oral tablet: 1 tab(s) orally 2 times a day  ramipril 10 mg oral capsule: 0.5 cap(s) orally 2 times a day   Aspirin Enteric Coated 81 mg oral delayed release tablet: 1 tab(s) orally once a day  atorvastatin 20 mg oral tablet: 1 tab(s) orally once a day (at bedtime)  Isordil 10 mg sublingual tablet: 0.5 tab(s) sublingual 3 times a day  metoprolol tartrate 50 mg oral tablet: 1 tab(s) orally 2 times a day  ramipril 10 mg oral capsule: 0.5 cap(s) orally 2 times a day

## 2021-08-13 NOTE — DISCHARGE NOTE PROVIDER - PROVIDER TOKENS
FREE:[LAST:[PCP],FIRST:[External],PHONE:[(   )    -],FAX:[(   )    -],FOLLOWUP:[2 weeks]] FREE:[LAST:[PCP],FIRST:[External],PHONE:[(   )    -],FAX:[(   )    -],FOLLOWUP:[2 weeks]],PROVIDER:[TOKEN:[[id number]]] PROVIDER:[TOKEN:[[id number]]],FREE:[LAST:[PCP],FIRST:[External],PHONE:[(   )    -],FAX:[(   )    -],FOLLOWUP:[2 weeks]],PROVIDER:[TOKEN:[[id number]]]

## 2021-08-13 NOTE — DISCHARGE NOTE PROVIDER - HOSPITAL COURSE
61-yearr-old female with HTN, HLD, ?CAD on DAPT for ?elevated troponin and chest pain in [facility] (no history of echo/stress/stents), presenting with intermittent left sided chest pain radiating to the sternum since 10AM. Pain started while patient was getting ready this morning. Pain improved slightly with nitroglycerin at home. She reports pain was 9/10, lasting a few minutes at a time, at one point radiates to left neck, and at times radiates to left upper back. She noted associated diaphoresis and had numbness in her fingers of her left hand transiently that self resolved. She had an episode of vertigo at home that also self resolved. She notes a frontal headache, no vision changes, no weakness. Patient received 4 baby aspirin and 1 nitroglycerin from EMS. At present pain is 6/10, radiating only to sternum. Pt traveled from [facility] 1 month ago, is here on vacation. She denies shortness of breath, cough, LE edema.    HOSPITAL COURSE  Chest Pain  - trop <6 x3  - telemetry   - low suspicion for PE  - dimer WNL  - TSH WNL   - TTE 8/13: Minimal mitral regurgitation.  Normal left ventricular diastolic function.  Normal right ventricular size and function  - CTA chest/abdomen/pelvis 8/13: No aortic dissection or other explanation of chest pain.  Left renal lesion with higher density than a simple cyst. Further evaluation with ultrasound is recommended.  Incidental very small lung nodules, likely benign. 1 year follow-up is recommended.  - Nuclear stress test 8/13: Myocardial Perfusion SPECT results are normal.    CAD  - unclear history  - ASA, Plavix   - no prior echo/stress testing      Patient remained stable with no events on telemetry.  She is medically clear for discharge home.  Case discussed with Dr. Reynaga on 8/13/2021. 62 y/o female, from [facility], with a PmHx of HTN, HLD, CAD on DAPT for ?elevated troponin and chest pain in [facility] (no history of echo/stress/stents), presenting to the [facility] ED with intermittent left sided chest pain radiating to the sternum since 10AM. Pain started while patient was getting ready this morning. Pain improved slightly with nitroglycerin at home. She reports pain was 9/10, lasting a few minutes at a time, at one point radiates to left neck, and at times radiates to left upper back. She noted associated diaphoresis and had numbness in her fingers of her left hand transiently that self resolved. She had an episode of vertigo at home that also self resolved. She notes a frontal headache, no vision changes, no weakness. Patient received 4 baby aspirin and 1 nitroglycerin from EMS. At present pain is 6/10, radiating only to sternum. Pt traveled from [facility] 1 month ago, is here on vacation. She denies shortness of breath, cough, LE edema.    On admission:     1. Chest Pain  EKG: NSR @ 60  hsTrop: < 6--> < 6 --> <6        COVID neg         BNP: 115         D-dimer: < 150  8/11 CXR - clear lungs  8/13 TTE - EF 64%, Minimal mitral regurgitation.Normal left ventricular diastolic function. Normal right ventricular size and function  8/13 CTA Aorta Chest/Abd/Pelvis - No aortic dissection or other explanation of chest pain.  Left renal lesion with higher density than a simple cyst. Further evaluation with ultrasound is recommended.Incidental very small lung nodules, likely benign. 1 year follow-up is recommended.  8/13 NST - Myocardial Perfusion SPECT results are normal.    2. CAD  - unclear history  - cont ASA, plavix     3. HTN  - monitor bp, cont metoprolol, lisinopril, isordil     4. HLD  - fasting lipid profile, cont statin    5. Headache  - s/p nitro use, possible etiology  - acetaminophen PRN     Pt comfortable at this time and is now medically cleared for discharge home as per Dr. Reynaga. Pt to follow up with her outpatient Cardiologist back in [facility] when returns back home.    Reviewed discharge medications with patient; All new medications requiring new prescription sent to pharmacy of patients choice. Reviewed need for prescription from previous home medications and new prescriptions sent if requested. Patient in agreement and understands. 62 y/o female, from [facility], with a PmHx of HTN, HLD, CAD on DAPT for ?elevated troponin and chest pain in [facility] (no history of echo/stress/stents), presenting to the [facility] ED with intermittent left sided chest pain radiating to the sternum since 10AM. Pain started while patient was getting ready this morning. Pain improved slightly with nitroglycerin at home. She reports pain was 9/10, lasting a few minutes at a time, at one point radiates to left neck, and at times radiates to left upper back. She noted associated diaphoresis and had numbness in her fingers of her left hand transiently that self resolved. She had an episode of vertigo at home that also self resolved. She notes a frontal headache, no vision changes, no weakness. Patient received 4 baby aspirin and 1 nitroglycerin from EMS. At present pain is 6/10, radiating only to sternum. Pt traveled from [facility] 1 month ago, is here on vacation. She denies shortness of breath, cough, LE edema.    On admission:     1. Chest Pain  EKG: NSR @ 60  hsTrop: < 6--> < 6 --> <6        COVID neg         BNP: 115         D-dimer: < 150  8/11 CXR - clear lungs  8/13 TTE - EF 64%, Minimal mitral regurgitation.Normal left ventricular diastolic function. Normal right ventricular size and function  8/13 CTA Aorta Chest/Abd/Pelvis - No aortic dissection or other explanation of chest pain.  Left renal lesion with higher density than a simple cyst. Further evaluation with ultrasound is recommended.Incidental very small lung nodules, likely benign. 1 year follow-up is recommended.  8/13 NST - Myocardial Perfusion SPECT results are normal.    2. CAD  - unclear history  - cont ASA, plavix     3. HTN  - monitor bp, cont metoprolol, lisinopril, isordil     4. HLD  - fasting lipid profile, cont statin    5. Headache  - s/p nitro use, possible etiology  - acetaminophen PRN     6. Renal Mass Note on CT Scan  8/13 Renal US - left renal mass likely represents a complex cyst amendable to follow up.  - Renal c/s Dr. Mathews following    Pt comfortable at this time and is now medically cleared for discharge home as per Dr. Reynaga. Pt to follow up with her outpatient Cardiologist back in [facility] when returns back home.    Reviewed discharge medications with patient; All new medications requiring new prescription sent to pharmacy of patients choice. Reviewed need for prescription from previous home medications and new prescriptions sent if requested. Patient in agreement and understands. 60 y/o female, from [facility], with a PmHx of HTN, HLD, CAD on DAPT for ?elevated troponin and chest pain in [facility] (no history of echo/stress/stents), presenting to the [facility] ED with intermittent left sided chest pain radiating to the sternum since 10AM. Pain started while patient was getting ready this morning. Pain improved slightly with nitroglycerin at home. She reports pain was 9/10, lasting a few minutes at a time, at one point radiates to left neck, and at times radiates to left upper back. She noted associated diaphoresis and had numbness in her fingers of her left hand transiently that self resolved. She had an episode of vertigo at home that also self resolved. She notes a frontal headache, no vision changes, no weakness. Patient received 4 baby aspirin and 1 nitroglycerin from EMS. At present pain is 6/10, radiating only to sternum. Pt traveled from [facility] 1 month ago, is here on vacation. She denies shortness of breath, cough, LE edema.    On admission:     1. Chest Pain  EKG: NSR @ 60  hsTrop: < 6--> < 6 --> <6        COVID neg         BNP: 115         D-dimer: < 150  8/11 CXR - clear lungs  8/13 TTE - EF 64%, Minimal mitral regurgitation.Normal left ventricular diastolic function. Normal right ventricular size and function  8/13 CTA Aorta Chest/Abd/Pelvis - No aortic dissection or other explanation of chest pain.  Left renal lesion with higher density than a simple cyst. Further evaluation with ultrasound is recommended.Incidental very small lung nodules, likely benign. 1 year follow-up is recommended.  8/13 NST - Myocardial Perfusion SPECT results are normal.    2. CAD  - unclear history  - cont ASA, plavix     3. HTN  - monitor bp, cont metoprolol, lisinopril, isordil     4. HLD  - fasting lipid profile, cont statin    5. Headache  - s/p nitro use, possible etiology  - acetaminophen PRN     6. Renal Mass Note on CT Scan  8/13 Renal US - left renal mass likely represents a complex cyst amendable to follow up.  - Renal c/s Dr. Mathews following    Case dw Dr Guo Patient stable for discharge home. As per Dr guo ok to dc plavix     Pt comfortable at this time and is now medically cleared for discharge home as per Dr. Guo. Pt to follow up with her outpatient Cardiologist back in [facility] when returns back home.    Reviewed discharge medications with patient; All new medications requiring new prescription sent to pharmacy of patients choice. Reviewed need for prescription from previous home medications and new prescriptions sent if requested. Patient in agreement and understands. 62 y/o female, from [facility], with a PmHx of HTN, HLD, CAD on DAPT for ?elevated troponin and chest pain in [facility] (no history of echo/stress/stents), presenting to the [facility] ED with intermittent left sided chest pain radiating to the sternum since 10AM. Pain started while patient was getting ready this morning. Pain improved slightly with nitroglycerin at home. She reports pain was 9/10, lasting a few minutes at a time, at one point radiates to left neck, and at times radiates to left upper back. She noted associated diaphoresis and had numbness in her fingers of her left hand transiently that self resolved. She had an episode of vertigo at home that also self resolved. She notes a frontal headache, no vision changes, no weakness. Patient received 4 baby aspirin and 1 nitroglycerin from EMS. At present pain is 6/10, radiating only to sternum. Pt traveled from [facility] 1 month ago, is here on vacation. She denies shortness of breath, cough, LE edema.    On admission:     1. Chest Pain  EKG: NSR @ 60  hsTrop: < 6--> < 6 --> <6        COVID neg         BNP: 115         D-dimer: < 150  8/11 CXR - clear lungs  8/13 TTE - EF 64%, Minimal mitral regurgitation.Normal left ventricular diastolic function. Normal right ventricular size and function  8/13 CTA Aorta Chest/Abd/Pelvis - No aortic dissection or other explanation of chest pain.  Left renal lesion with higher density than a simple cyst. Further evaluation with ultrasound is recommended.Incidental very small lung nodules, likely benign. 1 year follow-up is recommended.  8/13 NST - Myocardial Perfusion SPECT results are normal.    2. CAD  - unclear history  - cont ASA, plavix     3. HTN  - monitor bp, cont metoprolol, lisinopril, isordil     4. HLD  - fasting lipid profile, cont statin    5. Headache  - s/p nitro use, possible etiology  - acetaminophen PRN     6. Renal Mass Note on CT Scan  8/13 Renal US - left renal mass likely represents a complex cyst amendable to follow up.  - Renal c/s Dr. Mathews following        Pt comfortable at this time and is now medically cleared for discharge home as per Dr. Guo. Pt to follow up with her outpatient Cardiologist back in [facility] when returns back home.    Reviewed discharge medications with patient; All new medications requiring new prescription sent to pharmacy of patients choice. Reviewed need for prescription from previous home medications and new prescriptions sent if requested. Patient in agreement and understands. Case dw Dr Guo Patient stable for discharge home. As per Dr guo ok to dc plavix 62 y/o female, from [facility], with a PmHx of HTN, HLD, CAD on DAPT for ?elevated troponin and chest pain in [facility] (no history of echo/stress/stents), presenting to the [facility] ED with intermittent left sided chest pain radiating to the sternum since 10AM. Pain started while patient was getting ready this morning. Pain improved slightly with nitroglycerin at home. She reports pain was 9/10, lasting a few minutes at a time, at one point radiates to left neck, and at times radiates to left upper back. She noted associated diaphoresis and had numbness in her fingers of her left hand transiently that self resolved. She had an episode of vertigo at home that also self resolved. She notes a frontal headache, no vision changes, no weakness. Patient received 4 baby aspirin and 1 nitroglycerin from EMS. At present pain is 6/10, radiating only to sternum. Pt traveled from [facility] 1 month ago, is here on vacation. She denies shortness of breath, cough, LE edema.    On admission:     1. Chest Pain  EKG: NSR @ 60  hsTrop: < 6--> < 6 --> <6        COVID neg         BNP: 115         D-dimer: < 150  8/11 CXR - clear lungs  8/13 TTE - EF 64%, Minimal mitral regurgitation.Normal left ventricular diastolic function. Normal right ventricular size and function  8/13 CTA Aorta Chest/Abd/Pelvis - No aortic dissection or other explanation of chest pain.  Left renal lesion with higher density than a simple cyst. Further evaluation with ultrasound is recommended.Incidental very small lung nodules, likely benign. 1 year follow-up is recommended.  8/13 NST - Myocardial Perfusion SPECT results are normal.    2. CAD  - unclear history  - cont ASA, plavix     3. HTN  - monitor bp, cont metoprolol, lisinopril, isordil     4. HLD  - fasting lipid profile, cont statin    5. Headache  - s/p nitro use, possible etiology  - acetaminophen PRN     6. Renal Mass Note on CT Scan  8/13 Renal US - left renal mass likely represents a complex cyst amendable to follow up.  - Renal c/s Dr. Mathews following        Pt comfortable at this time and is now medically cleared for discharge home as per Dr. Guo. Pt to follow up with her outpatient Cardiologist back in [facility] when returns back home.    Reviewed discharge medications with patient; All new medications requiring new prescription sent to pharmacy of patients choice. Reviewed need for prescription from previous home medications and new prescriptions sent if requested. Patient in agreement and understands. Case dw Dr Guo Patient stable for discharge home. As per Dr guo ok to dc plavix. Patient does not need it. FU Dr guo 2 weeks and Follow up Dr Mathews in 2 weeks. Follow up with left renal cyst and small lung nodules in one year.

## 2021-08-14 VITALS
RESPIRATION RATE: 17 BRPM | OXYGEN SATURATION: 100 % | HEART RATE: 75 BPM | DIASTOLIC BLOOD PRESSURE: 60 MMHG | SYSTOLIC BLOOD PRESSURE: 135 MMHG | TEMPERATURE: 98 F

## 2021-08-14 LAB
ANION GAP SERPL CALC-SCNC: 12 MMOL/L — SIGNIFICANT CHANGE UP (ref 7–14)
BUN SERPL-MCNC: 16 MG/DL — SIGNIFICANT CHANGE UP (ref 7–23)
CALCIUM SERPL-MCNC: 10.2 MG/DL — SIGNIFICANT CHANGE UP (ref 8.4–10.5)
CHLORIDE SERPL-SCNC: 103 MMOL/L — SIGNIFICANT CHANGE UP (ref 98–107)
CO2 SERPL-SCNC: 24 MMOL/L — SIGNIFICANT CHANGE UP (ref 22–31)
CREAT SERPL-MCNC: 0.78 MG/DL — SIGNIFICANT CHANGE UP (ref 0.5–1.3)
GLUCOSE SERPL-MCNC: 105 MG/DL — HIGH (ref 70–99)
HCT VFR BLD CALC: 37.9 % — SIGNIFICANT CHANGE UP (ref 34.5–45)
HGB BLD-MCNC: 12.3 G/DL — SIGNIFICANT CHANGE UP (ref 11.5–15.5)
MAGNESIUM SERPL-MCNC: 1.9 MG/DL — SIGNIFICANT CHANGE UP (ref 1.6–2.6)
MCHC RBC-ENTMCNC: 30.1 PG — SIGNIFICANT CHANGE UP (ref 27–34)
MCHC RBC-ENTMCNC: 32.5 GM/DL — SIGNIFICANT CHANGE UP (ref 32–36)
MCV RBC AUTO: 92.7 FL — SIGNIFICANT CHANGE UP (ref 80–100)
NRBC # BLD: 0 /100 WBCS — SIGNIFICANT CHANGE UP
NRBC # FLD: 0 K/UL — SIGNIFICANT CHANGE UP
PHOSPHATE SERPL-MCNC: 4.1 MG/DL — SIGNIFICANT CHANGE UP (ref 2.5–4.5)
PLATELET # BLD AUTO: 273 K/UL — SIGNIFICANT CHANGE UP (ref 150–400)
POTASSIUM SERPL-MCNC: 4.1 MMOL/L — SIGNIFICANT CHANGE UP (ref 3.5–5.3)
POTASSIUM SERPL-SCNC: 4.1 MMOL/L — SIGNIFICANT CHANGE UP (ref 3.5–5.3)
RBC # BLD: 4.09 M/UL — SIGNIFICANT CHANGE UP (ref 3.8–5.2)
RBC # FLD: 13.3 % — SIGNIFICANT CHANGE UP (ref 10.3–14.5)
SODIUM SERPL-SCNC: 139 MMOL/L — SIGNIFICANT CHANGE UP (ref 135–145)
WBC # BLD: 4.65 K/UL — SIGNIFICANT CHANGE UP (ref 3.8–10.5)
WBC # FLD AUTO: 4.65 K/UL — SIGNIFICANT CHANGE UP (ref 3.8–10.5)

## 2021-08-14 RX ORDER — CLOPIDOGREL BISULFATE 75 MG/1
1 TABLET, FILM COATED ORAL
Qty: 0 | Refills: 0 | DISCHARGE

## 2021-08-14 RX ADMIN — Medication 50 MILLIGRAM(S): at 05:00

## 2021-08-14 RX ADMIN — ISOSORBIDE DINITRATE 5 MILLIGRAM(S): 5 TABLET ORAL at 16:36

## 2021-08-14 RX ADMIN — ISOSORBIDE DINITRATE 5 MILLIGRAM(S): 5 TABLET ORAL at 11:23

## 2021-08-14 RX ADMIN — Medication 81 MILLIGRAM(S): at 11:23

## 2021-08-14 RX ADMIN — CLOPIDOGREL BISULFATE 75 MILLIGRAM(S): 75 TABLET, FILM COATED ORAL at 11:23

## 2021-08-14 RX ADMIN — ISOSORBIDE DINITRATE 5 MILLIGRAM(S): 5 TABLET ORAL at 05:00

## 2021-08-14 RX ADMIN — LISINOPRIL 20 MILLIGRAM(S): 2.5 TABLET ORAL at 05:00

## 2021-08-14 NOTE — CONSULT NOTE ADULT - ASSESSMENT
61-yearr-old female with HTN, HLD, ?CAD on DAPT for ?elevated troponin and chest pain in [facility] (no history of echo/stress/stents), presenting with intermittent left sided chest pain   2.4 cm left lesion on CT and there was no vascular uptake/Renal sono showed a complex lesion    1 Renal- Minimal malignant potential for this lesion but this will still require surveillance and follow up.  She was told in laymans language that the lesion [place] 9[place] and she had good understanding of the above  No renal objection to [place] home     Sayed [facility]   [id number]

## 2021-08-14 NOTE — CONSULT NOTE ADULT - SUBJECTIVE AND OBJECTIVE BOX
NEPHROLOGY - NSN    Patient seen and examined.    HPI:  61-yearr-old female with HTN, HLD, ?CAD on DAPT for ?elevated troponin and chest pain in [facility] (no history of echo/stress/stents), presenting with intermittent left sided chest pain radiating to the sternum since 10AM. Pain started while patient was getting ready this morning. Pain improved slightly with nitroglycerin at home. She reports pain was 9/10, lasting a few minutes at a time, at one point radiates to left neck, and at times radiates to left upper back. She noted associated diaphoresis and had numbness in her fingers of her left hand transiently that self resolved. She had an episode of vertigo at home that also self resolved. She notes a frontal headache, no vision changes, no weakness. Patient received 4 baby aspirin and 1 nitroglycerin from EMS. At present pain is 6/10, radiating only to sternum. Pt traveled from [facility] 1 month ago, is here on vacation. She denies shortness of breath, cough, LE edema.    In the ED VS:  97.9  64-68  143-151/68-78  15-18  100%RA (11 Aug 2021 20:40)  8/13 TTE - EF 64%, Minimal mitral regurgitation.Normal left ventricular diastolic function. Normal right ventricular size and function  8/13 CTA Aorta Chest/Abd/Pelvis - No aortic dissection or other explanation of chest pain.  Left renal lesion with higher density than a simple cyst. Further evaluation with ultrasound is recommended.Incidental very small lung nodules, likely benign. 1 year follow-up is recommended.  8/13 NST - Myocardial Perfusion SPECT results are normal.  8/13 Renal US - left renal mass likely represents a complex cyst amendable to follow up.      PAST MEDICAL & SURGICAL HISTORY:  Hypertension    Hyperlipidemia    No significant past surgical history        MEDICATIONS  (STANDING):  aspirin enteric coated 81 milliGRAM(s) Oral daily  atorvastatin 20 milliGRAM(s) Oral at bedtime  clopidogrel Tablet 75 milliGRAM(s) Oral daily  isosorbide   dinitrate Tablet (ISORDIL) 5 milliGRAM(s) Oral three times a day  lisinopril 20 milliGRAM(s) Oral two times a day  metoprolol tartrate 50 milliGRAM(s) Oral two times a day      Allergies    No Known Allergies    Intolerances        SOCIAL HISTORY:  Denies alcohol abuse, drug abuse or tobacco usage.     FAMILY HISTORY:  Family history of cerebrovascular accident (CVA) in father    Family history of heart disease (Mother)        VITALS:  T(C): 36.9 (08-14-21 @ 12:23), Max: 36.9 (08-14-21 @ 12:23)  HR: 57 (08-14-21 @ 12:23) (57 - 74)  BP: 117/66 (08-14-21 @ 12:23) (117/66 - 156/74)  RR: 17 (08-14-21 @ 12:23) (17 - 18)  SpO2: 98% (08-14-21 @ 12:23) (98% - 100%)    REVIEW OF SYSTEMS:  Denies any nausea, vomiting, diarrhea, fever or chills.  All other pertinent systems are reviewed and are negative.    PHYSICAL EXAM:  Constitutional: NAD  HEENT: EOMI  Neck:  No JVD, supple   Respiratory: CTA B/L  Cardiovascular: S1 and S2, RRR  Gastrointestinal: + BS, soft, NT, ND  Extremities: No peripheral edema, + peripheral pulses  Neurological: A/O x 3, CN2-12 intact  Psychiatric: Normal mood, normal affect  : No [place]  Skin: No rashes, C/D/I  Access: Not applicable    I and O's:        LABS:                        12.3   4.65  )-----------( 273      ( 14 Aug 2021 05:46 )             37.9     08-14    139  |  103  |  16  ----------------------------<  105<H>  4.1   |  24  |  0.78    Ca    10.2      14 Aug 2021 05:46  Phos  4.1     08-14  Mg     1.90     08-14        URINE:      RADIOLOGY & ADDITIONAL STUDIES:    < from: US Kidney and Bladder (08.13.21 @ 17:45) >    EXAM:  US KIDNEYS AND BLADDER        PROCEDURE DATE:  Aug 13 2021         INTERPRETATION:  CLINICAL INFORMATION: Left renal lesion on prior CT scan.    COMPARISON: CT chest abdomen pelvis 8/13/2021    TECHNIQUE: Sonography of the kidneys and bladder.    FINDINGS:    Right kidney: 9.5 cm. No renal mass, hydronephrosis or calculi.    Left kidney: 10.9 cm. Hypoechoic ovoid renal mass measuring 1.9 x 2.0 cm without internal septation or vascularity likely cyst.    Urinary bladder: Within normal limits.    Miscellaneous: Cholelithiasis.    IMPRESSION:    Left renal mass likely represents a complex cyst amenable to follow-up.    --- End of Report ---            DELTA SABILLON MD; Resident Radiologist  This document has been electronically signed.  BTETY OWENS MD; Attending Radiologist  This document has been electronically signed. Aug 14 2021 11:15AM    < end of copied text >  < from: CT Angio Abdomen and Pelvis w/ IV Cont (08.13.21 @ 12:28) >    EXAM:  CT ANGIO ABD PELV (W)AW IC      EXAM:  CT ANGIO CHEST AORTA WAWI        PROCEDURE DATE:  Aug 13 2021         INTERPRETATION:  INDICATION: Chest pain, rule out dissection    CTA of the chest, abdomen and pelvis was performed from the thoracic inlet to the level of the pubic symphysis following IV contrast injection of  90 cc of Omnipaque 350. No immediate complications were reported.    COMPARISON: None    FINDINGS:    Aorta:  No intramural hematoma in the chest. No aneurysm, dissection,or penetrating atherosclerotic ulcer. Minimal aortic plaque in the abdominal aorta. Patent branches. No aortic aneurysm      Chest:    Mediastinum and Heart: Normal heart size. Unremarkable pericardium. Normal caliber main pulmonary artery. Unremarkable thyroid. No enlarged lymph nodes.    Lungs, Pleura, and Airways: Patent airways to the segmental bronchi. Right upper lobe 5 mm solid nodule (5-33). 2 mm solid nodule in the lingula (5-79). Unremarkable pleura.    Bones and Soft Tissues: Unremarkable.      Abdomen and Pelvis:    Liver: Unremarkable.    Biliary System: Unremarkable.    Spleen:Unremarkable.    Pancreas: Unremarkable    Adrenals: Unremarkable..    Kidneys: 2.4 cm hypodensity in the left interpolar region    Bowel: Unremarkable.    Pelvis: Unremarkable. Gynecologic organs suboptimally evaluated on this exam.    Other: No ascites. No enlarged lymph nodes.    Bones and Soft Tissues: Unremarkable.        IMPRESSION:    No aortic dissection or other explanation of chest pain.    Left renal lesion with higher density than a simple cyst. Further evaluation with ultrasound is recommended.    Incidental very small lung nodules, likely benign. 1 year follow-up is recommended.      --- End of Report ---              SARAH HUSAIN M.D., ATTENDING RADIOGIST  This document has been electronically signed. Aug 13 2021  1:33PM    < end of copied text >  
Requesting Physician : ER     Reason for Consultation: Chest pain     HISTORY OF PRESENT ILLNESS: 61 year old female with history of HTN, HLD ? cad who is being seen for chest pain.  The patient has been having intermittent chest pain for several months that worsened over the last day prompting her to come to the ED.  The patient's chest pain is described as squeezing with occasional radiation to the left scapula.  Her pain occurs at rest and with exertion.  The patient was evaluated in [facility] for these symptoms at which time she was placed on plavix - the patient did not undergo cardiac testing at that time.  She was admitted and ruled out for MI.  D-dimer was negative.  The patient was chest pain free upon evaluation.           PAST MEDICAL & SURGICAL HISTORY:  Hypertension    Hyperlipidemia    No significant past surgical history            MEDICATIONS:  MEDICATIONS  (STANDING):  aspirin enteric coated 81 milliGRAM(s) Oral daily  atorvastatin 20 milliGRAM(s) Oral at bedtime  clopidogrel Tablet 75 milliGRAM(s) Oral daily  isosorbide   dinitrate Tablet (ISORDIL) 5 milliGRAM(s) Oral three times a day  lisinopril 20 milliGRAM(s) Oral two times a day  metoprolol tartrate 50 milliGRAM(s) Oral two times a day      Allergies    No Known Allergies    Intolerances        FAMILY HISTORY:  Family history of cerebrovascular accident (CVA) in father    Family history of heart disease (Mother)      Non-contributary for premature coronary disease or sudden cardiac death    SOCIAL HISTORY:    [x ] Non-smoker  [ ] Smoker  [ ] Alcohol      REVIEW OF SYSTEMS:  [x ]chest pain  [  ]shortness of breath  [  ]palpitations  [  ]syncope  [ ]near syncope [ ]upper extremity weakness   [ ] lower extremity weakness  [  ]diplopia  [  ]altered mental status   [  ]fevers  [ ]chills [ ]nausea  [ ]vomitting  [  ]dysphagia    [ ]abdominal pain  [ ]melena  [ ]BRBPR    [  ]epistaxis  [  ]rash    [ ]lower extremity edema        [x ] All others negative	  [ ] Unable to obtain    PHYSICAL EXAM:  T(C): 36.8 (08-12-21 @ 11:47), Max: 36.9 (08-11-21 @ 21:13)  HR: 67 (08-12-21 @ 11:47) (59 - 73)  BP: 140/94 (08-12-21 @ 11:47) (134/75 - 154/83)  RR: 17 (08-12-21 @ 11:47) (15 - 18)  SpO2: 100% (08-12-21 @ 11:47) (100% - 100%)  Wt(kg): --  I&O's Summary        HEENT:   Normal oral mucosa, PERRL, EOMI	  Lymphatic: No lymphadenopathy , no edema  Cardiovascular: Normal S1 S2, No JVD, No murmurs , Peripheral pulses palpable 2+ bilaterally  Respiratory: Lungs clear to auscultation, normal effort 	  Gastrointestinal:  Soft, Non-tender, + BS	  Skin: No rashes, No ecchymoses, No cyanosis, warm to touch  Musculoskeletal: Normal range of motion, normal strength  Psychiatry:  Mood & affect appropriate      TELEMETRY: SR	    ECG:  SR	  RADIOLOGY:  OTHER:     DIAGNOSTIC TESTING:  [ ] Echocardiogram:  [ ]  Catheterization:  [ ] Stress Test:    	  	  LABS:	 	    CARDIAC MARKERS:                              12.4   3.80  )-----------( 266      ( 12 Aug 2021 06:42 )             38.1     08-12    141  |  104  |  8   ----------------------------<  106<H>  4.2   |  23  |  0.69    Ca    9.9      12 Aug 2021 06:24  Phos  3.6     08-12  Mg     2.10     08-12    TPro  7.7  /  Alb  4.4  /  TBili  1.2  /  DBili  x   /  AST  27  /  ALT  27  /  AlkPhos  99  08-11    proBNP: Serum Pro-Brain Natriuretic Peptide: 115 pg/mL (08-12 @ 06:24)    Lipid Profile:   HgA1c:   TSH: Thyroid Stimulating Hormone, Serum: 1.42 uIU/mL (08-12 @ 06:24)      ASSESSMENT/PLAN:  61 year old female with history of HTN, HLD ? cad who is being seen for chest pain.    -MI ruled out  -d-dimer is negative   -pt. with no high risk features at this time  -check TTE and NST  -further workup pending above    Lisa Reynaga MD

## 2021-08-14 NOTE — PROGRESS NOTE ADULT - ATTENDING COMMENTS
Patient seen and examined.  Agree with above.   NST normal  CT negative for dissection  D-dimer negative  Appreciate renal eval - outpatient repeat ultrasound recommended  No further inpatient cardiac workup needed at this time.   Suspect symptoms were MSK  Pt. was placed on plavix for presumed CAD in [facility] [place] (prior to any cardiac testing) - pt with no history of pci or cva and is concerned about risks of dapt - given above, will dc plavix  DC home - pt. to follow up with her doctors in [facility] [place] after discharge    Lisa Reynaga MD

## 2021-08-14 NOTE — PROGRESS NOTE ADULT - SUBJECTIVE AND OBJECTIVE BOX
Date of service: 08/14/21    chief complaint: chest pain     extended hpi: 61 year old female with history of HTN, HLD ? cad who is being seen for chest pain.  The patient has been having intermittent chest pain for several months that worsened over the last day prompting her to come to the ED.  S: no chest pain or sob; ros otherwise negative.     Review of Systems:   Constitutional: [ ] fevers, [ ] chills.   Skin: [ ] dry skin. [ ] rashes.  Psychiatric: [ ] depression, [ ] anxiety.   Gastrointestinal: [ ] BRBPR, [ ] melena.   Neurological: [ ] confusion. [ ] seizures. [ ] shuffling gait.   Ears,Nose,Mouth and Throat: [ ] ear pain [ ] sore throat.   Eyes: [ ] diplopia.   Respiratory: [ ] hemoptysis. [ ] shortness of breath  Cardiovascular: See HPI above  Hematologic/Lymphatic: [ ] anemia. [ ] painful nodes. [ ] prolonged bleeding.   Genitourinary: [ ] hematuria. [ ] flank pain.   Endocrine: [ ] significant change in weight. [ ] intolerance to heat and cold.     Review of systems [x ] otherwise negative, [ ] otherwise unable to obtain    FH: no family history of sudden cardiac death in first degree relatives    SH: [ ] tobacco, [ ] alcohol, [ ] drugs         acetaminophen   Tablet .. 650 milliGRAM(s) Oral every 6 hours PRN  aspirin enteric coated 81 milliGRAM(s) Oral daily  atorvastatin 20 milliGRAM(s) Oral at bedtime  clopidogrel Tablet 75 milliGRAM(s) Oral daily  isosorbide   dinitrate Tablet (ISORDIL) 5 milliGRAM(s) Oral three times a day  lisinopril 20 milliGRAM(s) Oral two times a day  metoprolol tartrate 50 milliGRAM(s) Oral two times a day                            12.3   4.65  )-----------( 273      ( 14 Aug 2021 05:46 )             37.9       Hemoglobin: 12.3 g/dL (08-14 @ 05:46)  Hemoglobin: 11.7 g/dL (08-13 @ 07:16)  Hemoglobin: 12.4 g/dL (08-12 @ 06:42)  Hemoglobin: 11.6 g/dL (08-11 @ 16:57)      08-14    139  |  103  |  16  ----------------------------<  105<H>  4.1   |  24  |  0.78    Ca    10.2      14 Aug 2021 05:46  Phos  4.1     08-14  Mg     1.90     08-14      Creatinine Trend: 0.78<--, 0.83<--, 0.69<--, 0.67<--    COAGS:     CARDIAC MARKERS ( 12 Aug 2021 19:08 )  x     / x     / x     / x     / 1.3 ng/mL        T(C): 36.6 (08-14-21 @ 05:00), Max: 36.9 (08-13-21 @ 12:56)  HR: 65 (08-14-21 @ 05:00) (56 - 74)  BP: 142/80 (08-14-21 @ 05:00) (128/68 - 156/74)  RR: 18 (08-14-21 @ 05:00) (17 - 18)  SpO2: 100% (08-14-21 @ 05:00) (100% - 100%)  Wt(kg): --    I&O's Summary  General: Well nourished in no acute distress. Alert and Oriented * 3.   Head: Normocephalic and atraumatic.   Neck: No JVD. No bruits. Supple. Does not appear to be enlarged.   Cardiovascular: + S1,S2 ; RRR Soft systolic murmur at the left lower sternal border. No rubs noted.    Lungs: CTA b/l. No rhonchi, rales or wheezes.   Abdomen: + BS, soft. Non tender. Non distended. No rebound. No guarding.   Extremities: No clubbing/cyanosis/edema.   Neurologic: Moves all four extremities. Full range of motion.   Skin: Warm and moist. The patient's skin has normal elasticity and good skin turgor.   Psychiatric: Appropriate mood and affect.  Musculoskeletal: Normal range of motion, normal strength  Chest: some point tenderness along right chest    Tele: SR    TTE:  < from: Transthoracic Echocardiogram (08.13.21 @ 08:11) >  1. Normal mitral valve. Minimal mitral regurgitation.  2. Normal left ventricular internal dimensions and wall  thicknesses.  3. Normal left ventricular systolic function. No segmental  wall motion abnormalities.  4. Normal left ventricular diastolic function.  5. Normal right ventricular size and function.    < end of copied text >    CT chest : c< from: CT Angio Abdomen and Pelvis w/ IV Cont (08.13.21 @ 12:28) >    IMPRESSION:    No aortic dissection or other explanation of chest pain.    Left renal lesion with higher density than a simple cyst. Further evaluation with ultrasound is recommended.    Incidental very small lung nodules, likely benign. 1 year follow-up is recommended.      --- End of Report ---              SARAH HUSAIN M.D., ATTENDING RADIOGIST  This document has been electronically signed. Aug 13 2021  1:33PM    < end of copied text >    NST : nc< from: Nuclear Stress Test-Pharmacologic (08.13.21 @ 10:04) >  ------------------------------------------------------------------------  IMPRESSIONS:Normal Study  * Myocardial Perfusion SPECT results are normal.  * Review of raw data shows: The study is of good technical  quality.  * The left ventricle was normal in size. Normal myocardial  perfusion scan,with no evidence of infarction or inducible  ischemia.  * Post-stress gated wall motion analysis was performed  (LVEF = 60 %;LVEDV = 104 ml.), revealing normal LV  function. There were no segmental wall motion  abnormalities. RV function appeared normal.  ------------------------------------------------------------------------  Confirmed on  8/13/2021 - 12:43:00 by Vernon Wilson    < end of copied text >    A/P: 61 year old female with history of HTN, HLD ? cad who is being seen for chest pain.  The patient has been having intermittent chest pain for several months that worsened over the last day prompting her to come to the ED.    -TTE with normal LV function   -MI ruled out  - NST with no ischemia   - ct scan with no dissection    -further workup pending above   D/W Dr Reynaga

## 2021-08-14 NOTE — DISCHARGE NOTE NURSING/CASE MANAGEMENT/SOCIAL WORK - PATIENT PORTAL LINK FT
You can access the FollowMyHealth Patient Portal offered by [facility] by registering at the following website: [email or web address]. By joining [facility]’s FollowMyHealth portal, you will also be able to view your health information using other applications (apps) compatible with our system.

## 2022-07-05 NOTE — ED ADULT NURSE NOTE - SUICIDE SCREENING QUESTION 3
Administrative documentation to document COVID-19 status.  COVID-19 status: Most recent Covid-19 test positive 7/04/22    Onset of Symptom Date:  7/03/22    Patient reporting low fever, headache, body aches, and sore throat. Denies SOB/breathing concerns.   Advised to treat current sx with advil/tylenol and throat lozenges and to update office if sx worsen.   Given info on quarantine period.     Patient verbalized understanding. Dr Voss is updated.     No

## 2024-05-04 NOTE — PATIENT PROFILE ADULT - HEALTH LITERACY
Procedure preformed with attending supervision.   I was present for and supervised the key and critical aspects of the procedures preformed during the care of the patient.
no